# Patient Record
Sex: MALE | Race: WHITE | Employment: STUDENT | URBAN - METROPOLITAN AREA
[De-identification: names, ages, dates, MRNs, and addresses within clinical notes are randomized per-mention and may not be internally consistent; named-entity substitution may affect disease eponyms.]

---
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
## 2025-05-16 ENCOUNTER — OFFICE VISIT (OUTPATIENT)
Dept: FAMILY MEDICINE CLINIC | Facility: CLINIC | Age: 20
End: 2025-05-16
Payer: COMMERCIAL

## 2025-05-16 VITALS
OXYGEN SATURATION: 99 % | WEIGHT: 174 LBS | HEIGHT: 73 IN | RESPIRATION RATE: 16 BRPM | BODY MASS INDEX: 23.06 KG/M2 | DIASTOLIC BLOOD PRESSURE: 84 MMHG | SYSTOLIC BLOOD PRESSURE: 136 MMHG | HEART RATE: 101 BPM | TEMPERATURE: 98 F

## 2025-05-16 DIAGNOSIS — R03.0 ELEVATED BLOOD PRESSURE READING IN OFFICE WITHOUT DIAGNOSIS OF HYPERTENSION: ICD-10-CM

## 2025-05-16 DIAGNOSIS — L29.3 ITCHING OF MALE GENITALIA: Primary | ICD-10-CM

## 2025-05-16 PROCEDURE — 3008F BODY MASS INDEX DOCD: CPT | Performed by: NURSE PRACTITIONER

## 2025-05-16 PROCEDURE — 99212 OFFICE O/P EST SF 10 MIN: CPT | Performed by: NURSE PRACTITIONER

## 2025-05-16 PROCEDURE — 3075F SYST BP GE 130 - 139MM HG: CPT | Performed by: NURSE PRACTITIONER

## 2025-05-16 PROCEDURE — 3079F DIAST BP 80-89 MM HG: CPT | Performed by: NURSE PRACTITIONER

## 2025-05-16 NOTE — PROGRESS NOTES
CHIEF COMPLAINT:     Chief Complaint   Patient presents with    Groin Pain     Sx 1 month - Itchy irritation around groin  Sx 6 days - Peeling skin around groin that have turned into scabs  Denies drainage from area  No OTC       HPI:     Reggie Alvarenga is a 19 year old male who presents with concerns of genital itching waxing and waning for the past 1 month.  Sometimes the skin will flake and scab- that has resolved at this time.  The itching seems worst after the gym.  Goes to the gym regularly.  Denies fever, dysuria, abdominal pain, flank pain, hematuria, penile discharge, or rashes otherwise.  Pt denies Hx of sexual intercourse- has had 2 episodes of non-penetrative sexual activity.  Has not been applying anything to the area.  Home from college, goes to school in [place].        Current Medications[1]   Past Medical History[2]   Social History:  Short Social Hx on File[3]     REVIEW OF SYSTEMS:   GENERAL: feels well otherwise, no fever, no chills.  SKIN: as above.  CHEST: no chest pains, no palpitations.  LUNGS: denies shortness of breath with exertion or rest. No wheezing, no cough.  LYMPH: No enlargement of the lymph nodes.  MUSC/SKEL: no joint swelling, no joint stiffness.  CARDIOVASCULAR: denies chest pain on exertion or rest.  GI: no nausea, no vomiting or abdominal pain  NEURO: no abnormal sensation, no tingling of the skin or numbness.    EXAM:   /84   Pulse 101   Temp 98.3 °F (36.8 °C) (Tympanic)   Resp 16   Ht 6' 1\" (1.854 m)   Wt 174 lb (78.9 kg)   SpO2 99%   BMI 22.96 kg/m²   GENERAL: Well-appearing, well developed, well nourished, and in no apparent distress  SKIN: No rashes  : Normal male genitalia, no rashes or lesions.  LYMPH: No lymphadenopathy.      ASSESSMENT AND PLAN:     ASSESSMENT:   Encounter Diagnoses   Name Primary?    Itching of male genitalia Yes    Elevated blood pressure reading in office without diagnosis of hypertension        PLAN:   - Current exam normal.  -  Suspect pt getting intermittent jock itch.  Discussed prevention measures and otc topics that pt can utilize.  - Keep open to air when possible, wear cotton breathable clothing, take off wet gym clothes and shower right away.  - Discussed bp elevated but suspect white coat HTN as pt admits was very nervous to come in for this today.  Advised re check with PCP for his physical within 1 month.  - The patient was advised to return to clinic should rash return or if new symptoms.  - The patient indicates understanding of these issues and agrees to the plan.    Requested Prescriptions      No prescriptions requested or ordered in this encounter       There are no Patient Instructions on file for this visit.                    [1]   No current outpatient medications on file.   [2]   Past Medical History:   Attention deficit disorder with hyperactivity    [place] screening tests negative    Sever's apophysitis, left   [3]   Social History  Socioeconomic History    Marital status: Single   Tobacco Use    Smoking status: Never    Smokeless tobacco: Never   Substance and Sexual Activity    Alcohol use: No    Drug use: Never   Other Topics Concern    Second-hand smoke exposure No    Violence concerns No    Pt has a pacemaker No    Pt has a defibrillator No    Reaction to local anesthetic No     Social Drivers of Health     Food Insecurity: No Food Insecurity (3/11/2024)    Received from [facility]    Hunger Vital Sign     Worried About Running Out of Food in the Last Year: Never true     Ran Out of Food in the Last Year: Never true   Transportation Needs: No Transportation Needs (3/11/2024)    Received from [facility]    PRAPARE - Transportation     Lack of Transportation (Medical): No     Lack of Transportation (Non-Medical): No

## 2025-05-19 ENCOUNTER — OFFICE VISIT (OUTPATIENT)
Dept: FAMILY MEDICINE CLINIC | Facility: CLINIC | Age: 20
End: 2025-05-19
Payer: COMMERCIAL

## 2025-05-19 VITALS
RESPIRATION RATE: 16 BRPM | HEIGHT: 73 IN | DIASTOLIC BLOOD PRESSURE: 86 MMHG | OXYGEN SATURATION: 99 % | TEMPERATURE: 98 F | HEART RATE: 120 BPM | SYSTOLIC BLOOD PRESSURE: 148 MMHG | WEIGHT: 174 LBS | BODY MASS INDEX: 23.06 KG/M2

## 2025-05-19 DIAGNOSIS — N50.812 PAIN IN LEFT TESTICLE: Primary | ICD-10-CM

## 2025-05-19 PROCEDURE — 3008F BODY MASS INDEX DOCD: CPT | Performed by: PHYSICIAN ASSISTANT

## 2025-05-19 PROCEDURE — 3079F DIAST BP 80-89 MM HG: CPT | Performed by: PHYSICIAN ASSISTANT

## 2025-05-19 PROCEDURE — 99214 OFFICE O/P EST MOD 30 MIN: CPT | Performed by: PHYSICIAN ASSISTANT

## 2025-05-19 PROCEDURE — 3077F SYST BP >= 140 MM HG: CPT | Performed by: PHYSICIAN ASSISTANT

## 2025-05-20 NOTE — PROGRESS NOTES
CHIEF COMPLAINT:     Chief Complaint   Patient presents with    genital pain     Sx Friday - L testicle discomfort when walking  Denies swelling, fever, chills, body aches, nausea, vomiting  No OTC       HPI:   Reggie Alvarenga is a 19 year old male who presents with complaints of left testicular pain and dysuria for the last 3 days.  The patient was seen 3 days ago for a genital rash which he reports has resolved. The patient denies fever, urinary frequency, urgency, testicular swelling, penile discharge,  nausea, vomiting, history of UTI and history of kidney stones.  The patient denies sexual activity or concerns for STIs.  The patient denies trauma.  The patient is concerned he has a testicular torsion.     Current Medications[1]   Past Medical History[2]   Social History:  Short Social Hx on File[3]     REVIEW OF SYSTEMS:   GENERAL: Denies fever, chills,weight change, decreased appetite  SKIN: Denies rashes, skin wounds or ulcers.  EYES: Denies blurred vision or double vision  HENT: Denies congestion, rhinorrhea, sore throat or ear pain  CHEST: Denies chest pain, or palpitations  LUNGS: Denies shortness of breath, cough, or wheezing  GI: Denies abdominal pain, N/V/C/D.   MUSCULOSKELETAL: no arthralgia or swollen joints  LYMPH:  Denies lymphadenopathy  NEURO: Denies headaches or lightheadedness      EXAM:   /86   Pulse 120   Temp 97.6 °F (36.4 °C) (Tympanic)   Resp 16   Ht 6' 1\" (1.854 m)   Wt 174 lb (78.9 kg)   SpO2 99%   BMI 22.96 kg/m²   GENERAL: well developed, well nourished,in no apparent distress, cooperative   SKIN: no rashes, nosuspicious lesions, no abnormal pigmentation  LUNGS: clear to auscultation bilaterally, no wheezes or rhonchi. Breathing is non labored.  CARDIO: RRR without murmur  GI: No visible scars, or masses. BS's present x4. No palpable masses or hepatosplenomegaly.  Non tender.  No guarding or rebound tenderness  EXTREMITIES: no cyanosis, clubbing or edema.  Homans NEG.   Dorsalis Pedis 2+.  LYMPH:  No lymphadenopathy.    NEURO: A&Ox3.  CN II-XII intact.  No focal deficits.  Coordination and Gait normal.  Kernig and Brudzinski's are negative.  GENITAL: Normal exam     Discussed limitation of the [facility].  Discussed a higher level of care vs PCP follow up     ASSESSMENT AND PLAN:     ASSESSMENT:  Encounter Diagnosis   Name Primary?    Pain in left testicle Yes       PLAN:    There are no Patient Instructions on file for this visit.             [1]   No current outpatient medications on file.   [2]   Past Medical History:   Attention deficit disorder with hyperactivity    [place] screening tests negative    Sever's apophysitis, left   [3]   Social History  Socioeconomic History    Marital status: Single   Tobacco Use    Smoking status: Never    Smokeless tobacco: Never   Substance and Sexual Activity    Alcohol use: No    Drug use: Never   Other Topics Concern    Second-hand smoke exposure No    Violence concerns No    Pt has a pacemaker No    Pt has a defibrillator No    Reaction to local anesthetic No     Social Drivers of Health     Food Insecurity: No Food Insecurity (3/11/2024)    Received from [facility]    Hunger Vital Sign     Worried About Running Out of Food in the Last Year: Never true     Ran Out of Food in the Last Year: Never true   Transportation Needs: No Transportation Needs (3/11/2024)    Received from [facility]    PRAPARE - Transportation     Lack of Transportation (Medical): No     Lack of Transportation (Non-Medical): No

## (undated) NOTE — MR AVS SNAPSHOT
[facility] [place] [address] [address] [address] [phone]               Thank you for choosing us for your health care visit with Arben Paulino MD.  We are glad to serve you and happy to provide you with this summary of y [place] access allows you to view health information for your child from their recent   visit, view other health information and more. To sign up or find more information on getting   Proxy Access to your child’s SOAST[employer]art go to https://[employer]. [facility]. org family routines to help everyone lead healthier active lives.  Try:  o Eating breakfast everyday  o Eating low-fat dairy products like yogurt, milk, and cheese  o Regularly eating meals together as a family  o Limiting fast food, take out food, and eating o

## (undated) NOTE — LETTER
7/5/2018              [address] [facility] [address]         To Whom It May Concern,    Gerald Durham is a patient of our healthcare office.  He is medically diagnosed with ADHD with some anxiety, so he has t

## (undated) NOTE — LETTER
3Name:  Bernie Butler School Year:  11th Grade Class: Student ID No.:   Address:  [address] [address] Phone:  [phone] (home)  :  15year old   Name Relationship [address] Work Phone Home Phone Mobile Phone   1.  Niesha Candelario other)     9. Has a doctor ever ordered a test for your heart? 10. Do you get lightheaded or feel more short of breath than expected during exercise? 11. Have you ever had an unexplained seizure? 12.  Do you get more tired or short of breath mo has asthma? 34. Were you born without or are you missing a kidney, eye, testicle, spleen, or any other organ? 30. Do you have a groin pain or a painful bulge or hernia in the groin area?     31. Have you had infectious mono within the last month? _____________________________________     Signature of parent/guardian: __________________________________________   HJTI:3/97/4410               EXAMINATION   /65   Pulse 72   Ht 6'   Wt 69.1 kg (152 lb 6.4 oz)   BMI 20.67 kg/m²  42 %ile (Z= -0.21) Advanced Nurse Practitioner's Signature*      Raphael Perez MD    *effective January 2003, the [facility] Board of Directors approved a recommendation, consistent with the [facility] [employer], that allows [employer] or Advanced Nurse Practitione Society for [address] [place] [address] [employer] of [employer]. Permission granted to reprint for noncommercial, educational purposes with acknowledgment.    [id number]

## (undated) NOTE — LETTER
11/2/2019              [address] [facility] [address]         To Whom It May Concern,    Please be advised Morgan Baez was seen in my office on 11/1/19 and diagnosed with pneumonia.  Please excuse him from swimming throu

## (undated) NOTE — LETTER
[address] [facility] Examination       Student's Name  Rosalia Roberts Title     MD                      Date  8/2/18   Signature Grade Level/ID#  8th grade   HEALTH HISTORY          TO BE COMPLETED AND SIGNED BY PARENT/GUARDIAN AND VERIFIED BY HEALTH CARE PROVIDER    ALLERGIES  (Food, drug, insect, other) MEDICATION  (List all prescribed or taken on a regular basis.)     Diagnosis /73   Pulse 72   Ht 5' 9\" (1.753 m)   Wt 58.1 kg (128 lb)   BMI 18.90 kg/m²     DIABETES SCREENING  BMI>85% age/sex  No And any two of the following:  Family History No   Ethnic Minority  No          Signs of Insulin Resistance (hypertension, dyslip Currently Prescribed Asthma Medication:            Quick-relief  medication (e.g. Short Acting Beta Antagonist): No          Controller medication (e.g. inhaled corticosteroid):   No Other   NEEDS/MODIFICATIONS required in the school setting  None DIET

## (undated) NOTE — LETTER
2017              Argelia Coelho (: [birth year])        [address] [address]         To Whom It May Concern,    Argelia Coelho is a patient of our healthcare office.  He is medically diagnosed with ADHD, any accommodat

## (undated) NOTE — LETTER
2019              Argelia Coelho ( 2005)         [address] [place] [address]         To Whom It May Concern,    Argelia Coelho is a patient of our healthcare office.  He is medically diagnosed with ADHD with some an

## (undated) NOTE — LETTER
11/8/2021              [address] [facility] [place] [address]         To whom it may concern,    I saw Magdalene Alfredo in my office today. He has strep throat and is being treated.  Cleared to return to school as yoanna

## (undated) NOTE — LETTER
4/30/2021              [address] [facility] [address] [address]     To whom it may concern, Edilberto Godfrey  has a history of underlying environmental allergies and allergic rhinitis including pollens and pets.

## (undated) NOTE — LETTER
VACCINE ADMINISTRATION RECORD  PARENT / GUARDIAN APPROVAL  Date: 3/30/2018  Vaccine administered to: Danuta Henriquez     : 2005    MRN: [id number]    A copy of the appropriate Centers for Disease Control and Prevention Vaccine Information sta

## (undated) NOTE — LETTER
VACCINE ADMINISTRATION RECORD  PARENT / GUARDIAN APPROVAL  Date: 2017  Vaccine administered to: Sweetie Denton     : 2005    MRN: [id number]    A copy of the appropriate Centers for Disease Control and Prevention Vaccine Information sta

## (undated) NOTE — LETTER
Name:  Bryson Mccormick Year:  7th Grade Class: Student ID No.:   Address:  [facility] [address] [address] Phone:  [phone] (home)  :  14year old   Name Relationship [address] Work Phone Home Phone Mobile Phone   1.  Kan Orantes unexpected or unexplained sudden death before age 48?     15. Does anyone in your family have hypertrophic cardiomyopathy, Marfan syndrome, arrhythmogenic right ventricular cardiomyopathy, long QT syndrome, short QT syndrome, Brugada syndrome, or catechola 29. Have you ever had a head injury or concussion? 35. Have you ever had a hit or blow to the head that caused confusion, prolonged headache, or memory problems? 36. Do you have a history of seizure disorder?     37.  Do you have headaches with exer MEDICAL NORMAL ABNORMAL FINDINGS   Appearance:  Marfan stigmata (kyphoscoliosis, high-arched palate, pectus excavatum,      arachnodactyly, arm span > height, hyperlaxity, myopia, MVP, aortic insufficiency) Yes    Eyes/Ears/Nose/Throat:  Pupils equal    He that I/our student will not use performance-enhancing substances as defined in the [facility] Performance-Enhancing Substance Testing Program Protocol.  We have reviewed the policy and understand that I/our student may be asked to submit to testing for the presen

## (undated) NOTE — LETTER
[address] [place] of Child Health Examination       Student's Name  Candance Riding Signature                                                                                                                                   Title                           Date     Signature Male School   Grade Level/ID#  {DMG_GRADE:1366}   HEALTH HISTORY          TO BE COMPLETED AND SIGNED BY PARENT/GUARDIAN AND VERIFIED BY HEALTH CARE PROVIDER    ALLERGIES  (Food, drug, insect, other) MEDICATION  (List all prescribed or taken on a regular ba There were no vitals taken for this visit.     DIABETES SCREENING  BMI>85% age/sex  {YES_NO:585::\"No\"} And any two of the following:  Family History {YES_NO:585::\"No\"}   Ethnic Minority  {YES_NO:585::\"No\"}          Signs of Insulin Resistance (hyperte Nose {YES:829::\"Yes\"}  Neurological {YES:829::\"Yes\"}    Throat {YES:829::\"Yes\"}  Musculoskeletal {YES:829::\"Yes\"}    Mouth/Dental {YES:829::\"Yes\"}  Spinal examination {YES:829::\"Yes\"}    Cardiovascular/HTN {YES:829::\"Yes\"}  Nutritional status Address/Phone  [address] [place], [place]  [address] [phone]

## (undated) NOTE — LETTER
5/10/2018              [address] [facility]        [address] [facility] [address]         To Whom It May Concern,    Hailey  for missed school this week; it is possible that he may attend 5/11 but not likely.  He should be able to return

## (undated) NOTE — LETTER
Name:  Leighann Giordano Year:  10th Grade Class: Student ID No.:   [facility] [address] [address] Phone:  [phone] (home)  :  12year old   Name Relationship [address] Work Phone Home Phone Mobile Phone   1.  Violeta Rubio 13. Does anyone in your family have a heart problem, pacemaker, or implanted defibrillator? 12. Has anyone in your family had unexplained fainting, seizures, or near drowning?      BONE AND JOINT QUESTIONS Yes No   17. Have you ever had an injury to a b after being hit or falling? 39.Have you ever been unable to move your arms / legs after being hit /fall? 40. Have you ever become ill while exercising in the heat?     41. Do you get frequent muscle cramps when exercising? 42.  Do you or someone · Murmurs (auscultation standing, supine, +/- Valsalva)  · Location of point of maximal impulse (PMI) Yes    Pulses Yes    Lungs Yes    Abdomen Yes    Genitourinary (males only)* Yes    Skin:  HSV, lesions suggestive of MRSA, tinea corporis Yes    Neurolog Protocol.  We have reviewed the policy and understand that I/our student may be asked to submit to testing for the presence of performance-enhancing substances in my/his/her body either during [employer] state series events or during the school day, and I/our francisca

## (undated) NOTE — LETTER
2020              Zeinab Muñoz         2005           To Whom It May Concern: This is to certify that Lester Parisi had an appointment on 2020 with KARYN Salvador.  Please excuse him from school this morning, he

## (undated) NOTE — LETTER
5/7/2018              [address] [facility]        [address] [facility] [address]         To Whom It May Concern,    Faustina Juan Carlos for missed school today. He may be able to return tomorrow if he is feeling better.  Please give him more time to

## (undated) NOTE — Clinical Note
3/21/2017              Jason Alvarenga        [address] [facility] [address]         To Whom It May Concern,    Justina Nichols for missed school 3/20-3/23 due to illness. He should be able to return on 3/24.  Allow him to have water at his

## (undated) NOTE — LETTER
4/25/2019              [address] [facility] [address]         To Whom It May Concern,    Please be advised Raquel Issa was seen in my office today for otalgia. Please excuse him from school on the days he has missed.  He

## (undated) NOTE — MR AVS SNAPSHOT
[place]  [address]  [phone]               Thank you for choosing us for your health care visit with Bismark Gilliam MD.  We are glad to serve you and happy to provide you with this summa heart rates (while the temp is up).  A few tips on dealing with fever:  · Low grade fevers (<101) do not need to be treated unless the child is quite uncomfortable  · For fever >101, dress your child lightly, offer cool liquids and use fever reducers as nee Commonly known as:  ZOLOFT           Sulfacetamide Sodium (Acne) 10 % Lotn   Apply 1 Application topically daily. Commonly known as:  [place] Causey     Sign up for [employer] access for your child.   [employer] access allows you to view healt

## (undated) NOTE — LETTER
[facility] [employer] of Child Health Examination       Student's Name  Gerry Dotson Title     MD                      Date  5/10/2019   Signature Male School   Grade Level/ID#  9th Grade   HEALTH HISTORY          TO BE COMPLETED AND SIGNED BY PARENT/GUARDIAN AND VERIFIED BY HEALTH CARE PROVIDER    ALLERGIES  (Food, drug, insect, other)  Patient has no known allergies.  MEDICATION  (List all prescribe PHYSICAL EXAMINATION REQUIREMENTS (head circumference if <33 years old):   /74   Pulse 67   Ht 5' 10.5\" (1.791 m)   Wt 64.3 kg (141 lb 12.8 oz)   BMI 20.06 kg/m²     DIABETES SCREENING  BMI>85% age/sex  No And any two of the following:  Family Hist Respiratory Yes                   Diagnosis of Asthma: No Mental Health Yes        Currently Prescribed Asthma Medication:            Quick-relief  medication (e.g. Short Acting Beta Antagonist): No          Controller medication (e.g. inhaled corticostero

## (undated) NOTE — LETTER
4/25/2019              [address] [facility] [address]         To Whom It May Concern,    Please be advised Kizzy Josue was seen in my office today for otalgia. Please excuse him from the classes he missed.  He may return